# Patient Record
Sex: MALE | Race: WHITE | NOT HISPANIC OR LATINO | URBAN - METROPOLITAN AREA
[De-identification: names, ages, dates, MRNs, and addresses within clinical notes are randomized per-mention and may not be internally consistent; named-entity substitution may affect disease eponyms.]

---

## 2018-04-07 ENCOUNTER — OFFICE VISIT - HEALTHEAST (OUTPATIENT)
Dept: FAMILY MEDICINE | Facility: CLINIC | Age: 33
End: 2018-04-07

## 2018-04-07 DIAGNOSIS — S61.239A: ICD-10-CM

## 2021-06-01 VITALS — WEIGHT: 206.25 LBS | BODY MASS INDEX: 28.77 KG/M2

## 2021-06-17 NOTE — PROGRESS NOTES
Chief Complaint   Patient presents with     Hand Injury     PT CUT FINGER WITH A ADDI NAIL TODAY, RIGHT MIDDLE FINGER       HPI    Patient is here for puncture wound to right 3rd finger with a addi nail occurred today while moving a door frame. Bleeding stopped with pressure. NO pain now. Patient is due for tetanus. No tingling/numbness of affected finger.    ROS: Pertinent ROS noted in HPI.     Allergies   Allergen Reactions     Penicillins      Patient Active Problem List   Diagnosis     Overweight     Hyperlipidemia       Family History   Problem Relation Age of Onset     Breast cancer Mother      Diabetes type II Father      No Medical Problems Sister      No Medical Problems Sister        Social History     Social History     Marital status:      Spouse name: N/A     Number of children: N/A     Years of education: N/A     Occupational History     [employer]      [employer]     Social History Main Topics     Smoking status: Never Smoker     Smokeless tobacco: Never Used     Alcohol use 2.4 oz/week     4 Cans of beer per week     Drug use: No     Sexual activity: Yes     Partners: Female      Comment: 2013     Other Topics Concern     Not on file     Social History Narrative         Objective:    Vitals:    04/07/18 1541   BP: 110/76   Pulse: 76   Resp: 16   Temp: 97.5  F (36.4  C)   SpO2: 98%       Gen:NAD  MSK: 1 mm puncture, closed wound to right 3rd medial distal phalanx without foreign body, active bleeding. No evidence of tendon injury, wound is distal to the DIP. Full ROM and strength of all joints of the affected finger.  Neuro: intact gross sensation of the affected site.      Puncture wound of finger of right hand  -     diphth,pertus(acell),tetanus injection 0.5 mL (BOOSTRIX); Inject 0.5 mL into the shoulder, thigh, or buttocks once.      Wound cleaned with Betadine. Dressing applied. Wound cares and signs/symptoms of wound infection discussed, with close follow up  advice.